# Patient Record
Sex: FEMALE | Race: WHITE | NOT HISPANIC OR LATINO | Employment: FULL TIME | URBAN - METROPOLITAN AREA
[De-identification: names, ages, dates, MRNs, and addresses within clinical notes are randomized per-mention and may not be internally consistent; named-entity substitution may affect disease eponyms.]

---

## 2017-09-30 ENCOUNTER — APPOINTMENT (OUTPATIENT)
Dept: RADIOLOGY | Facility: MEDICAL CENTER | Age: 57
End: 2017-09-30
Attending: EMERGENCY MEDICINE
Payer: COMMERCIAL

## 2017-09-30 ENCOUNTER — HOSPITAL ENCOUNTER (EMERGENCY)
Facility: MEDICAL CENTER | Age: 57
End: 2017-09-30
Attending: EMERGENCY MEDICINE
Payer: COMMERCIAL

## 2017-09-30 VITALS
HEIGHT: 62 IN | BODY MASS INDEX: 26.68 KG/M2 | WEIGHT: 145 LBS | TEMPERATURE: 99.1 F | OXYGEN SATURATION: 97 % | HEART RATE: 72 BPM | SYSTOLIC BLOOD PRESSURE: 119 MMHG | RESPIRATION RATE: 18 BRPM | DIASTOLIC BLOOD PRESSURE: 60 MMHG

## 2017-09-30 DIAGNOSIS — S93.402A SPRAIN OF LEFT ANKLE, UNSPECIFIED LIGAMENT, INITIAL ENCOUNTER: ICD-10-CM

## 2017-09-30 DIAGNOSIS — S09.90XA CHI (CLOSED HEAD INJURY), INITIAL ENCOUNTER: ICD-10-CM

## 2017-09-30 DIAGNOSIS — S16.1XXA CERVICAL STRAIN, INITIAL ENCOUNTER: ICD-10-CM

## 2017-09-30 PROCEDURE — 99285 EMERGENCY DEPT VISIT HI MDM: CPT | Mod: EDC

## 2017-09-30 PROCEDURE — 700102 HCHG RX REV CODE 250 W/ 637 OVERRIDE(OP): Performed by: EMERGENCY MEDICINE

## 2017-09-30 PROCEDURE — 700111 HCHG RX REV CODE 636 W/ 250 OVERRIDE (IP): Performed by: EMERGENCY MEDICINE

## 2017-09-30 PROCEDURE — A9270 NON-COVERED ITEM OR SERVICE: HCPCS | Performed by: EMERGENCY MEDICINE

## 2017-09-30 PROCEDURE — 73610 X-RAY EXAM OF ANKLE: CPT | Mod: LT

## 2017-09-30 PROCEDURE — 70450 CT HEAD/BRAIN W/O DYE: CPT

## 2017-09-30 PROCEDURE — 96375 TX/PRO/DX INJ NEW DRUG ADDON: CPT | Mod: EDC

## 2017-09-30 PROCEDURE — 72125 CT NECK SPINE W/O DYE: CPT

## 2017-09-30 PROCEDURE — 96374 THER/PROPH/DIAG INJ IV PUSH: CPT | Mod: EDC

## 2017-09-30 RX ORDER — ACETAMINOPHEN 325 MG/1
650 TABLET ORAL ONCE
Status: COMPLETED | OUTPATIENT
Start: 2017-09-30 | End: 2017-09-30

## 2017-09-30 RX ORDER — IBUPROFEN 600 MG/1
600 TABLET ORAL ONCE
Status: COMPLETED | OUTPATIENT
Start: 2017-09-30 | End: 2017-09-30

## 2017-09-30 RX ORDER — ONDANSETRON 2 MG/ML
4 INJECTION INTRAMUSCULAR; INTRAVENOUS ONCE
Status: COMPLETED | OUTPATIENT
Start: 2017-09-30 | End: 2017-09-30

## 2017-09-30 RX ORDER — HYDROCODONE BITARTRATE AND ACETAMINOPHEN 5; 325 MG/1; MG/1
1-2 TABLET ORAL EVERY 6 HOURS PRN
Qty: 10 TAB | Refills: 0 | Status: SHIPPED | OUTPATIENT
Start: 2017-09-30

## 2017-09-30 RX ORDER — ONDANSETRON 4 MG/1
4 TABLET, ORALLY DISINTEGRATING ORAL EVERY 8 HOURS PRN
Qty: 10 TAB | Refills: 0 | Status: SHIPPED | OUTPATIENT
Start: 2017-09-30

## 2017-09-30 RX ADMIN — ACETAMINOPHEN 650 MG: 325 TABLET, FILM COATED ORAL at 16:00

## 2017-09-30 RX ADMIN — ONDANSETRON 4 MG: 2 INJECTION INTRAMUSCULAR; INTRAVENOUS at 17:16

## 2017-09-30 RX ADMIN — IBUPROFEN 600 MG: 600 TABLET, FILM COATED ORAL at 17:37

## 2017-09-30 RX ADMIN — ONDANSETRON 4 MG: 2 INJECTION INTRAMUSCULAR; INTRAVENOUS at 16:00

## 2017-09-30 ASSESSMENT — PAIN SCALES - GENERAL
PAINLEVEL_OUTOF10: 7
PAINLEVEL_OUTOF10: 2
PAINLEVEL_OUTOF10: 4
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
## 2017-10-01 NOTE — ED NOTES
Medicated per MAR for 4/10 generalized pain.  Denies nausea at this time.  Updated on POC/ discharge.  Pt's family to return with clothes to go home in.

## 2017-10-01 NOTE — DISCHARGE INSTRUCTIONS
Head Injury, Adult  You have received a head injury. It does not appear serious at this time. Headaches and vomiting are common following head injury. It should be easy to awaken from sleeping. Sometimes it is necessary for you to stay in the emergency department for a while for observation. Sometimes admission to the hospital may be needed. After injuries such as yours, most problems occur within the first 24 hours, but side effects may occur up to 7-10 days after the injury. It is important for you to carefully monitor your condition and contact your health care provider or seek immediate medical care if there is a change in your condition.  WHAT ARE THE TYPES OF HEAD INJURIES?  Head injuries can be as minor as a bump. Some head injuries can be more severe. More severe head injuries include:  · A jarring injury to the brain (concussion).  · A bruise of the brain (contusion). This mean there is bleeding in the brain that can cause swelling.  · A cracked skull (skull fracture).  · Bleeding in the brain that collects, clots, and forms a bump (hematoma).  WHAT CAUSES A HEAD INJURY?  A serious head injury is most likely to happen to someone who is in a car wreck and is not wearing a seat belt. Other causes of major head injuries include bicycle or motorcycle accidents, sports injuries, and falls.  HOW ARE HEAD INJURIES DIAGNOSED?  A complete history of the event leading to the injury and your current symptoms will be helpful in diagnosing head injuries. Many times, pictures of the brain, such as CT or MRI are needed to see the extent of the injury. Often, an overnight hospital stay is necessary for observation.   WHEN SHOULD I SEEK IMMEDIATE MEDICAL CARE?   You should get help right away if:  · You have confusion or drowsiness.  · You feel sick to your stomach (nauseous) or have continued, forceful vomiting.  · You have dizziness or unsteadiness that is getting worse.  · You have severe, continued headaches not  relieved by medicine. Only take over-the-counter or prescription medicines for pain, fever, or discomfort as directed by your health care provider.  · You do not have normal function of the arms or legs or are unable to walk.  · You notice changes in the black spots in the center of the colored part of your eye (pupil).  · You have a clear or bloody fluid coming from your nose or ears.  · You have a loss of vision.  During the next 24 hours after the injury, you must stay with someone who can watch you for the warning signs. This person should contact local emergency services (911 in the [place] if you have seizures, you become unconscious, or you are unable to wake up.  HOW CAN I PREVENT A HEAD INJURY IN THE FUTURE?  The most important factor for preventing major head injuries is avoiding motor vehicle accidents.  To minimize the potential for damage to your head, it is crucial to wear seat belts while riding in motor vehicles. Wearing helmets while bike riding and playing collision sports (like football) is also helpful. Also, avoiding dangerous activities around the house will further help reduce your risk of head injury.   WHEN CAN I RETURN TO NORMAL ACTIVITIES AND ATHLETICS?  You should be reevaluated by your health care provider before returning to these activities. If you have any of the following symptoms, you should not return to activities or contact sports until 1 week after the symptoms have stopped:  · Persistent headache.  · Dizziness or vertigo.  · Poor attention and concentration.  · Confusion.  · Memory problems.  · Nausea or vomiting.  · Fatigue or tire easily.  · Irritability.  · Intolerant of bright lights or loud noises.  · Anxiety or depression.  · Disturbed sleep.  MAKE SURE YOU:   · Understand these instructions.  · Will watch your condition.  · Will get help right away if you are not doing well or get worse.     This information is not intended to replace advice given to you by your health  care provider. Make sure you discuss any questions you have with your health care provider.     Document Released: 12/18/2006 Document Revised: 01/08/2016 Document Reviewed: 08/25/2014  [email or web address] Interactive Patient Education ©2016 [email or web address]          Cervical Sprain  A cervical sprain is an injury in the neck in which the strong, fibrous tissues (ligaments) that connect your neck bones stretch or tear. Cervical sprains can range from mild to severe. Severe cervical sprains can cause the neck vertebrae to be unstable. This can lead to damage of the spinal cord and can result in serious nervous system problems. The amount of time it takes for a cervical sprain to get better depends on the cause and extent of the injury. Most cervical sprains heal in 1 to 3 weeks.  CAUSES   Severe cervical sprains may be caused by:   · Contact sport injuries (such as from football, rugby, wrestling, hockey, auto racing, gymnastics, diving, martial arts, or boxing).    · Motor vehicle collisions.    · Whiplash injuries. This is an injury from a sudden forward and backward whipping movement of the head and neck.   · Falls.    Mild cervical sprains may be caused by:   · Being in an awkward position, such as while cradling a telephone between your ear and shoulder.    · Sitting in a chair that does not offer proper support.    · Working at a poorly designed computer station.    · Looking up or down for long periods of time.    SYMPTOMS   · Pain, soreness, stiffness, or a burning sensation in the front, back, or sides of the neck. This discomfort may develop immediately after the injury or slowly, 24 hours or more after the injury.    · Pain or tenderness directly in the middle of the back of the neck.    · Shoulder or upper back pain.    · Limited ability to move the neck.    · Headache.    · Dizziness.    · Weakness, numbness, or tingling in the hands or arms.    · Muscle spasms.    · Difficulty swallowing or chewing.    · Tenderness  and swelling of the neck.    DIAGNOSIS   Most of the time your health care provider can diagnose a cervical sprain by taking your history and doing a physical exam. Your health care provider will ask about previous neck injuries and any known neck problems, such as arthritis in the neck. X-rays may be taken to find out if there are any other problems, such as with the bones of the neck. Other tests, such as a CT scan or MRI, may also be needed.   TREATMENT   Treatment depends on the severity of the cervical sprain. Mild sprains can be treated with rest, keeping the neck in place (immobilization), and pain medicines. Severe cervical sprains are immediately immobilized. Further treatment is done to help with pain, muscle spasms, and other symptoms and may include:  · Medicines, such as pain relievers, numbing medicines, or muscle relaxants.    · Physical therapy. This may involve stretching exercises, strengthening exercises, and posture training. Exercises and improved posture can help stabilize the neck, strengthen muscles, and help stop symptoms from returning.    HOME CARE INSTRUCTIONS   · Put ice on the injured area.    ¨ Put ice in a plastic bag.    ¨ Place a towel between your skin and the bag.    ¨ Leave the ice on for 15-20 minutes, 3-4 times a day.    · If your injury was severe, you may have been given a cervical collar to wear. A cervical collar is a two-piece collar designed to keep your neck from moving while it heals.  ¨ Do not remove the collar unless instructed by your health care provider.  ¨ If you have long hair, keep it outside of the collar.  ¨ Ask your health care provider before making any adjustments to your collar. Minor adjustments may be required over time to improve comfort and reduce pressure on your chin or on the back of your head.  ¨ If you are allowed to remove the collar for cleaning or bathing, follow your health care provider's instructions on how to do so safely.  ¨ Keep your  collar clean by wiping it with mild soap and water and drying it completely. If the collar you have been given includes removable pads, remove them every 1-2 days and hand wash them with soap and water. Allow them to air dry. They should be completely dry before you wear them in the collar.  ¨ If you are allowed to remove the collar for cleaning and bathing, wash and dry the skin of your neck. Check your skin for irritation or sores. If you see any, tell your health care provider.  ¨ Do not drive while wearing the collar.    · Only take over-the-counter or prescription medicines for pain, discomfort, or fever as directed by your health care provider.    · Keep all follow-up appointments as directed by your health care provider.    · Keep all physical therapy appointments as directed by your health care provider.    · Make any needed adjustments to your workstation to promote good posture.    · Avoid positions and activities that make your symptoms worse.    · Warm up and stretch before being active to help prevent problems.    SEEK MEDICAL CARE IF:   · Your pain is not controlled with medicine.    · You are unable to decrease your pain medicine over time as planned.    · Your activity level is not improving as expected.    SEEK IMMEDIATE MEDICAL CARE IF:   · You develop any bleeding.  · You develop stomach upset.  · You have signs of an allergic reaction to your medicine.    · Your symptoms get worse.    · You develop new, unexplained symptoms.    · You have numbness, tingling, weakness, or paralysis in any part of your body.    MAKE SURE YOU:   · Understand these instructions.  · Will watch your condition.  · Will get help right away if you are not doing well or get worse.     This information is not intended to replace advice given to you by your health care provider. Make sure you discuss any questions you have with your health care provider.     Document Released: 10/14/2008 Document Revised: 12/23/2014 Document  Reviewed: 06/25/2014  [employer] Interactive Patient Education ©2016 [employer]          Ankle Sprain  An ankle sprain is an injury to the strong, fibrous tissues (ligaments) that hold the bones of your ankle joint together.   CAUSES  An ankle sprain is usually caused by a fall or by twisting your ankle. Ankle sprains most commonly occur when you step on the outer edge of your foot, and your ankle turns inward. People who participate in sports are more prone to these types of injuries.   SYMPTOMS   · Pain in your ankle. The pain may be present at rest or only when you are trying to stand or walk.  · Swelling.  · Bruising. Bruising may develop immediately or within 1 to 2 days after your injury.  · Difficulty standing or walking, particularly when turning corners or changing directions.  DIAGNOSIS   Your caregiver will ask you details about your injury and perform a physical exam of your ankle to determine if you have an ankle sprain. During the physical exam, your caregiver will press on and apply pressure to specific areas of your foot and ankle. Your caregiver will try to move your ankle in certain ways. An X-ray exam may be done to be sure a bone was not broken or a ligament did not separate from one of the bones in your ankle (avulsion fracture).   TREATMENT   Certain types of braces can help stabilize your ankle. Your caregiver can make a recommendation for this. Your caregiver may recommend the use of medicine for pain. If your sprain is severe, your caregiver may refer you to a surgeon who helps to restore function to parts of your skeletal system (orthopedist) or a physical therapist.  HOME CARE INSTRUCTIONS   · Apply ice to your injury for 1-2 days or as directed by your caregiver. Applying ice helps to reduce inflammation and pain.  ¨ Put ice in a plastic bag.  ¨ Place a towel between your skin and the bag.  ¨ Leave the ice on for 15-20 minutes at a time, every 2 hours while you are awake.  · Only take  over-the-counter or prescription medicines for pain, discomfort, or fever as directed by your caregiver.  · Elevate your injured ankle above the level of your heart as much as possible for 2-3 days.  · If your caregiver recommends crutches, use them as instructed. Gradually put weight on the affected ankle. Continue to use crutches or a cane until you can walk without feeling pain in your ankle.  · If you have a plaster splint, wear the splint as directed by your caregiver. Do not rest it on anything harder than a pillow for the first 24 hours. Do not put weight on it. Do not get it wet. You may take it off to take a shower or bath.  · You may have been given an elastic bandage to wear around your ankle to provide support. If the elastic bandage is too tight (you have numbness or tingling in your foot or your foot becomes cold and blue), adjust the bandage to make it comfortable.  · If you have an air splint, you may blow more air into it or let air out to make it more comfortable. You may take your splint off at night and before taking a shower or bath. Wiggle your toes in the splint several times per day to decrease swelling.  SEEK MEDICAL CARE IF:   · You have rapidly increasing bruising or swelling.  · Your toes feel extremely cold or you lose feeling in your foot.  · Your pain is not relieved with medicine.  SEEK IMMEDIATE MEDICAL CARE IF:  · Your toes are numb or blue.  · You have severe pain that is increasing.  MAKE SURE YOU:   · Understand these instructions.  · Will watch your condition.  · Will get help right away if you are not doing well or get worse.     This information is not intended to replace advice given to you by your health care provider. Make sure you discuss any questions you have with your health care provider.     Document Released: 12/18/2006 Document Revised: 01/08/2016 Document Reviewed: 12/29/2012  [employer] Interactive Patient Education ©2016 [employer]

## 2017-10-01 NOTE — ED NOTES
Assist RN  Discharge instructions given to pt including follow up w/her pcp when she gets back to [place],  Questions answered by RN. No new complaints made. Prescriptions x 2 given to pt.  Instructed no drinking no driving while on prescribed pain medication.  Pt able to ambulate to discharge accompanied by . VSS.